# Patient Record
Sex: MALE | Race: WHITE | Employment: FULL TIME | URBAN - METROPOLITAN AREA
[De-identification: names, ages, dates, MRNs, and addresses within clinical notes are randomized per-mention and may not be internally consistent; named-entity substitution may affect disease eponyms.]

---

## 2021-02-22 ENCOUNTER — APPOINTMENT (OUTPATIENT)
Dept: CT IMAGING | Facility: CLINIC | Age: 61
End: 2021-02-22
Attending: EMERGENCY MEDICINE
Payer: COMMERCIAL

## 2021-02-22 ENCOUNTER — HOSPITAL ENCOUNTER (EMERGENCY)
Facility: CLINIC | Age: 61
Discharge: HOME | End: 2021-02-22
Attending: EMERGENCY MEDICINE | Admitting: EMERGENCY MEDICINE
Payer: COMMERCIAL

## 2021-02-22 VITALS
HEART RATE: 75 BPM | DIASTOLIC BLOOD PRESSURE: 85 MMHG | SYSTOLIC BLOOD PRESSURE: 154 MMHG | TEMPERATURE: 97.8 F | RESPIRATION RATE: 18 BRPM | OXYGEN SATURATION: 94 %

## 2021-02-22 DIAGNOSIS — I10 HYPERTENSION, UNSPECIFIED TYPE: ICD-10-CM

## 2021-02-22 DIAGNOSIS — R10.9 FLANK PAIN: ICD-10-CM

## 2021-02-22 LAB
ALBUMIN UR-MCNC: NEGATIVE MG/DL
ANION GAP SERPL CALCULATED.3IONS-SCNC: 7 MMOL/L (ref 3–14)
APPEARANCE UR: CLEAR
BASOPHILS # BLD AUTO: 0.1 10E9/L (ref 0–0.2)
BASOPHILS NFR BLD AUTO: 1.2 %
BILIRUB UR QL STRIP: NEGATIVE
BUN SERPL-MCNC: 11 MG/DL (ref 7–30)
CALCIUM SERPL-MCNC: 9.7 MG/DL (ref 8.5–10.1)
CHLORIDE SERPL-SCNC: 99 MMOL/L (ref 94–109)
CO2 SERPL-SCNC: 30 MMOL/L (ref 20–32)
COLOR UR AUTO: ABNORMAL
CREAT SERPL-MCNC: 0.92 MG/DL (ref 0.66–1.25)
DIFFERENTIAL METHOD BLD: ABNORMAL
EOSINOPHIL # BLD AUTO: 0.1 10E9/L (ref 0–0.7)
EOSINOPHIL NFR BLD AUTO: 1.4 %
ERYTHROCYTE [DISTWIDTH] IN BLOOD BY AUTOMATED COUNT: 14 % (ref 10–15)
GFR SERPL CREATININE-BSD FRML MDRD: 88 ML/MIN/{1.73_M2}
GLUCOSE SERPL-MCNC: 135 MG/DL (ref 70–99)
GLUCOSE UR STRIP-MCNC: NEGATIVE MG/DL
HCT VFR BLD AUTO: 46.9 % (ref 40–53)
HGB BLD-MCNC: 15.6 G/DL (ref 13.3–17.7)
HGB UR QL STRIP: NEGATIVE
IMM GRANULOCYTES # BLD: 0 10E9/L (ref 0–0.4)
IMM GRANULOCYTES NFR BLD: 0.3 %
KETONES UR STRIP-MCNC: NEGATIVE MG/DL
LEUKOCYTE ESTERASE UR QL STRIP: NEGATIVE
LYMPHOCYTES # BLD AUTO: 0.7 10E9/L (ref 0.8–5.3)
LYMPHOCYTES NFR BLD AUTO: 11 %
MCH RBC QN AUTO: 30.1 PG (ref 26.5–33)
MCHC RBC AUTO-ENTMCNC: 33.3 G/DL (ref 31.5–36.5)
MCV RBC AUTO: 90 FL (ref 78–100)
MONOCYTES # BLD AUTO: 0.5 10E9/L (ref 0–1.3)
MONOCYTES NFR BLD AUTO: 6.9 %
NEUTROPHILS # BLD AUTO: 5.2 10E9/L (ref 1.6–8.3)
NEUTROPHILS NFR BLD AUTO: 79.2 %
NITRATE UR QL: NEGATIVE
NRBC # BLD AUTO: 0 10*3/UL
NRBC BLD AUTO-RTO: 0 /100
PH UR STRIP: 7.5 PH (ref 5–7)
PLATELET # BLD AUTO: 261 10E9/L (ref 150–450)
POTASSIUM SERPL-SCNC: 3.4 MMOL/L (ref 3.4–5.3)
RBC # BLD AUTO: 5.19 10E12/L (ref 4.4–5.9)
RBC #/AREA URNS AUTO: 1 /HPF (ref 0–2)
SODIUM SERPL-SCNC: 136 MMOL/L (ref 133–144)
SOURCE: ABNORMAL
SP GR UR STRIP: 1.01 (ref 1–1.03)
UROBILINOGEN UR STRIP-MCNC: NORMAL MG/DL (ref 0–2)
WBC # BLD AUTO: 6.5 10E9/L (ref 4–11)
WBC #/AREA URNS AUTO: 1 /HPF (ref 0–5)

## 2021-02-22 PROCEDURE — 80048 BASIC METABOLIC PNL TOTAL CA: CPT | Performed by: EMERGENCY MEDICINE

## 2021-02-22 PROCEDURE — 250N000011 HC RX IP 250 OP 636: Performed by: EMERGENCY MEDICINE

## 2021-02-22 PROCEDURE — 85025 COMPLETE CBC W/AUTO DIFF WBC: CPT | Performed by: EMERGENCY MEDICINE

## 2021-02-22 PROCEDURE — 99284 EMERGENCY DEPT VISIT MOD MDM: CPT | Mod: 25

## 2021-02-22 PROCEDURE — 96374 THER/PROPH/DIAG INJ IV PUSH: CPT

## 2021-02-22 PROCEDURE — 250N000013 HC RX MED GY IP 250 OP 250 PS 637: Performed by: EMERGENCY MEDICINE

## 2021-02-22 PROCEDURE — 74176 CT ABD & PELVIS W/O CONTRAST: CPT

## 2021-02-22 PROCEDURE — 81001 URINALYSIS AUTO W/SCOPE: CPT | Performed by: EMERGENCY MEDICINE

## 2021-02-22 RX ORDER — KETOROLAC TROMETHAMINE 15 MG/ML
15 INJECTION, SOLUTION INTRAMUSCULAR; INTRAVENOUS ONCE
Status: COMPLETED | OUTPATIENT
Start: 2021-02-22 | End: 2021-02-22

## 2021-02-22 RX ORDER — CYCLOBENZAPRINE HCL 10 MG
10 TABLET ORAL ONCE
Status: COMPLETED | OUTPATIENT
Start: 2021-02-22 | End: 2021-02-22

## 2021-02-22 RX ORDER — CYCLOBENZAPRINE HCL 10 MG
10 TABLET ORAL 3 TIMES DAILY PRN
Qty: 20 TABLET | Refills: 0 | Status: SHIPPED | OUTPATIENT
Start: 2021-02-22 | End: 2021-02-28

## 2021-02-22 RX ORDER — ACETAMINOPHEN 500 MG
1000 TABLET ORAL ONCE
Status: COMPLETED | OUTPATIENT
Start: 2021-02-22 | End: 2021-02-22

## 2021-02-22 RX ORDER — TRAMADOL HYDROCHLORIDE 50 MG/1
50-100 TABLET ORAL EVERY 6 HOURS PRN
Qty: 10 TABLET | Refills: 0 | Status: SHIPPED | OUTPATIENT
Start: 2021-02-22 | End: 2021-02-25

## 2021-02-22 RX ADMIN — KETOROLAC TROMETHAMINE 15 MG: 15 INJECTION, SOLUTION INTRAMUSCULAR; INTRAVENOUS at 09:30

## 2021-02-22 RX ADMIN — CYCLOBENZAPRINE HYDROCHLORIDE 10 MG: 10 TABLET, FILM COATED ORAL at 09:30

## 2021-02-22 RX ADMIN — ACETAMINOPHEN 1000 MG: 500 TABLET, FILM COATED ORAL at 09:30

## 2021-02-22 ASSESSMENT — ENCOUNTER SYMPTOMS
HEMATURIA: 0
FREQUENCY: 0
SHORTNESS OF BREATH: 0
WEAKNESS: 0
FEVER: 0
DYSURIA: 0
DIFFICULTY URINATING: 0
NUMBNESS: 0
BACK PAIN: 1
VOMITING: 0

## 2021-02-22 NOTE — ED NOTES
Patient ambulated from room 19 down to room 25. He was able to put his own shoes on and needed no assistance while ambulating. During the ambulation he stated that he is still in a little bit of pain but it is much better then this morning. He didn't have any complaints during the ambulation of dizziness or being unsteady.

## 2021-02-22 NOTE — ED PROVIDER NOTES
History   Chief Complaint:  Groin Pain     HPI   John E Mendele is a 60 year old male with history of hypertension who presents with groin pain. The patient reports intermittent episodes of right-lower groin pain that radiates to his right back for the past 1-2 weeks. He notes it often occurs while he is on the toilet, but soon resolves after he gets up and walks around. No trauma or lifting associated with this pain. However yesterday the patient states this pain worsened and this morning he was unable to get off the toilet due to pain for about 30 minutes. He notes it was worse with movement. He reports a history of back pain a couple years ago for which he was given muscle relaxers with relief, but states this was less severe than his current symptoms. He has never had imaging done of his back. The patient has not taken anything for pain this morning and has been eating and drinking per normal. He denies any urinary symptoms, bowel/bladder incontinence, numbness/tingling/weakness in lower extremities, fever, or vomiting. He further denies any chest pain, shortness of breath, or COVID symptoms. No pain in testicles or penis. No history of abdominal surgery, kidney stones, or diabetes but does note a family history of cancer. The patient is a nonsmoker, denies IV drug use, and notes rare alcohol use.     Review of Systems   Constitutional: Negative for fever.   Respiratory: Negative for shortness of breath.    Cardiovascular: Negative for chest pain.   Gastrointestinal: Negative for vomiting.   Genitourinary: Negative for decreased urine volume, difficulty urinating, dysuria, frequency, hematuria, penile pain, testicular pain and urgency.        Denies bowel/bladder incontinence   Musculoskeletal: Positive for back pain (+ groin pain).   Neurological: Negative for weakness and numbness.   All other systems reviewed and are negative.      Allergies:  Simvastatin      Medications:  Zyloprim  Norvasc  Hydrodiuril  Lisinopril     Past Medical History:    Hypertension  Gout  Umbilical hernia    Past Surgical History:  The patient denies any past surgical history.    Family History:  Cancer    Social History:  Patient presents alone.  Marital status:     Physical Exam     Patient Vitals for the past 24 hrs:   BP Temp Temp src Pulse Resp SpO2   02/22/21 1015 (!) 154/85 -- -- 75 -- 94 %   02/22/21 1000 (!) 157/91 -- -- 76 -- 96 %   02/22/21 0915 (!) 175/92 -- -- 88 -- 97 %   02/22/21 0907 -- -- -- -- -- 97 %   02/22/21 0900 (!) 165/91 -- -- -- -- --   02/22/21 0818 (!) 170/101 97.8  F (36.6  C) Temporal 83 18 98 %       Physical Exam  General: Adult male sitting upright  Eyes: PERRL, Conjunctive within normal limits.  No scleral icterus.  ENT: Moist mucous membranes, oropharynx clear.   CV: Normal S1S2, no murmur, rub or gallop. Regular rate and rhythm  Resp: Clear to auscultation bilaterally, no wheezes, rales or rhonchi. Normal respiratory effort.  GI: Abdomen is soft, nontender and nondistended. No palpable masses. No rebound or guarding.  CVA tenderness on the right on percussion.  Soft nontender small umbilical hernia.  : No palpable inguinal tenderness or fullness.  Testicles descended with positive cremasteric reflex.  No apparent tenderness palpation of the testicles.  MSK: No edema. Nontender. Normal active range of motion.  Skin: Warm and dry.  Nontender palpable quarter sized soft tissue mass over the right thoracic back with no overlying erythema.  Neuro: Alert and oriented. Responds appropriately to all questions and commands. No focal findings appreciated. Normal muscle tone.  Psych: Normal mood and affect. Pleasant.    Emergency Department Course     Imaging:    CT Abdomen Pelvis w/o IV contrast:   1. No radiodense kidney/ureteral stone or hydronephrosis in either   kidney.   2. Hepatic steatosis.   3. Colonic diverticulosis without CT evidence of acute  diverticulitis, as per radiology.    Laboratory:    CBC: WBC: 6.5, HGB: 15.6, PLT: 261  BMP: Glucose 135 (H), o/w WNL (Creatinine: 0.92)    UA: pH: 7.5 (H), o/w Negative    Emergency Department Course:    Reviewed:  I reviewed the patient's nursing notes, vitals, past medical records, Care Everywhere.     Assessments:  0921    I evaluated the patient and performed an exam as above.    1020    I updated the patient on results and discussed plan of care. He is feeling better at this time.    Interventions:  0930 Flexeril, 10 mg, Oral   Toradol, 15 mg, IV   Tylenol, 1,000 mg, Oral    Disposition:  The patient was discharged to home.     Impression & Plan       Medical Decision Making:  John E Mendele is a 60 year old male who presents with right-sided groin and back pain.  No associated symptoms   A broad differential diagnosis was considered including diverticulitis, ureterolithiasis, tumor, colitis, cholecystitis, aneurysm, dissection, hydronephrosis, pneumonia, rib fracture, UTI, pyelonephritis amongst many other etiologies.  I doubt PE given location of pain and no respiratory/chest symptoms.   The workup in the ED is at this point negative.  No etiology for the patients pain is found at this point and my suspicion of an intraabdominal or intrathoracic catastrophe or other worrisome etiology is very low.  I will not therefore admit for serial exams and further workup.  Patient is hemodynamically stable in ED. Plan is home with  pain recheck by primary care physician or return to ED at that time.  Return for fevers greater than 102, increasing pain, other new symptoms develop.  Flank pain handout given.  Questions were answered.     Diagnosis:    ICD-10-CM    1. Flank pain  R10.9    2. Hypertension, unspecified type  I10        Discharge Medications:  New Prescriptions    CYCLOBENZAPRINE (FLEXERIL) 10 MG TABLET    Take 1 tablet (10 mg) by mouth 3 times daily as needed for muscle spasms    TRAMADOL (ULTRAM) 50 MG  TABLET    Take 1-2 tablets ( mg) by mouth every 6 hours as needed for moderate to severe pain or severe pain (if tylenol or ibuprofen are not controlling your pain)       Scribe Disclosure:  I, Gita Hooker, am serving as a scribe at 9:20 AM on 2/22/2021 to document services personally performed by Kylah Del Real MD based on my observations and the provider's statements to me.      Kylah Del Real MD  02/23/21 2117

## 2021-02-22 NOTE — ED TRIAGE NOTES
Patient presents with right-lower groin pain that radiates into the right back for the past 1-2 weeks. Yesterday, symptoms got worse. Patient states he was unable to get up off the toilet this morning due to the pain for about 15 minutes, so decided to come in.